# Patient Record
Sex: MALE | Race: WHITE | URBAN - METROPOLITAN AREA
[De-identification: names, ages, dates, MRNs, and addresses within clinical notes are randomized per-mention and may not be internally consistent; named-entity substitution may affect disease eponyms.]

---

## 2018-03-15 ENCOUNTER — IMPORTED ENCOUNTER (OUTPATIENT)
Dept: URBAN - METROPOLITAN AREA CLINIC 50 | Facility: CLINIC | Age: 73
End: 2018-03-15

## 2018-03-30 ENCOUNTER — IMPORTED ENCOUNTER (OUTPATIENT)
Dept: URBAN - METROPOLITAN AREA CLINIC 50 | Facility: CLINIC | Age: 73
End: 2018-03-30

## 2018-03-30 NOTE — PATIENT DISCUSSION
"Monitor ""Start Systane Balance both eyes two - four times a day. sample given"" ""Start Warm compresses both eyes twice a day.  """

## 2020-01-08 NOTE — PATIENT DISCUSSION
Recommend Bilateral upper lid ptosis repair. Medicare. Extra skin removal to be determined. Skin does not affect MRDs. This will not meet Medicare criteria. Extra skin removal will be cosmetic. (discussed risks and benefits of sx. .. ).

## 2020-01-08 NOTE — PATIENT DISCUSSION
This visual field clearly demonstrated a minimum of 33% loss of upper field of vision OU, with upper lid skin in repose and elevated by taping of the lid to demonstrate potential correction. This field shows that taping the lids significantly improved this patient's superior field of vision by approximately 30%, OU.

## 2020-06-29 NOTE — PROCEDURE NOTE: SURGICAL
"<span style=""font-weight:bold;"">MR #:</span>&nbsp; [id number]<br /><br /><span style=""font-weight:bold;"">PREOPERATIVE DIAGNOSIS: &nbsp; </span>&nbsp; 1. Ptosis

## 2021-06-14 ASSESSMENT — VISUAL ACUITY
OD_CC: 20/25-
OD_CC: J1+
OS_CC: J1+
OS_CC: 20/30

## 2021-06-14 ASSESSMENT — TONOMETRY
OD_IOP_MMHG: 14
OS_IOP_MMHG: 13